# Patient Record
Sex: FEMALE | NOT HISPANIC OR LATINO | URBAN - METROPOLITAN AREA
[De-identification: names, ages, dates, MRNs, and addresses within clinical notes are randomized per-mention and may not be internally consistent; named-entity substitution may affect disease eponyms.]

---

## 2017-11-03 ENCOUNTER — IMPORTED ENCOUNTER (OUTPATIENT)
Dept: URBAN - METROPOLITAN AREA CLINIC 1 | Facility: CLINIC | Age: 72
End: 2017-11-03

## 2017-11-03 PROBLEM — H35.033: Noted: 2017-11-03

## 2017-11-03 PROBLEM — H04.123: Noted: 2017-11-03

## 2017-11-03 PROBLEM — Z96.1: Noted: 2017-11-03

## 2017-11-03 PROBLEM — H16.143: Noted: 2017-11-03

## 2017-11-03 PROCEDURE — 92014 COMPRE OPH EXAM EST PT 1/>: CPT

## 2017-11-03 NOTE — PATIENT DISCUSSION
1.  JODI w/ PEK OU- Improved OU. The continuation of artificial tears were recommended. 2.  GR I Hypertensive Retinopathy OU- Stable continue HTN Control3. Pseudophakia OU (StandardOU)- Doing well. 4.  Patient defers the refraction at today's visit    5. H/o RK OU6. Return for an appointment for a 27 in 1 year with Dr. Jennifer Peguero.

## 2018-11-02 ENCOUNTER — IMPORTED ENCOUNTER (OUTPATIENT)
Dept: URBAN - METROPOLITAN AREA CLINIC 1 | Facility: CLINIC | Age: 73
End: 2018-11-02

## 2018-11-02 PROBLEM — H35.033: Noted: 2018-11-02

## 2018-11-02 PROBLEM — H04.123: Noted: 2018-11-02

## 2018-11-02 PROBLEM — H16.143: Noted: 2018-11-02

## 2018-11-02 PROCEDURE — 92014 COMPRE OPH EXAM EST PT 1/>: CPT

## 2018-11-02 NOTE — PATIENT DISCUSSION
1.  JODI w/ PEK OU- Cont ATs TID OU Routinely. 2.  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 Pseudophakia OU (StandardOU) 4. PVD OS- stable RD precautions. 5.  H/o BETTYE OUReturn for an appointment in 1 yr 27 with Dr. Fernandez [place].

## 2019-11-01 ENCOUNTER — IMPORTED ENCOUNTER (OUTPATIENT)
Dept: URBAN - METROPOLITAN AREA CLINIC 1 | Facility: CLINIC | Age: 74
End: 2019-11-01

## 2019-11-01 PROBLEM — H43.813: Noted: 2019-11-01

## 2019-11-01 PROBLEM — Z96.1: Noted: 2019-11-01

## 2019-11-01 PROBLEM — H16.143: Noted: 2019-11-01

## 2019-11-01 PROBLEM — H43.812: Noted: 2019-11-01

## 2019-11-01 PROBLEM — H35.033: Noted: 2019-11-01

## 2019-11-01 PROBLEM — H04.123: Noted: 2019-11-01

## 2019-11-01 PROCEDURE — 92014 COMPRE OPH EXAM EST PT 1/>: CPT

## 2019-11-01 NOTE — PATIENT DISCUSSION
1.  JODI w/ PEK OU -- Increase ATs QID OU Routinely. 2.  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 Pseudophakia OU (StandardOU) 4. PVD OS- stable RD precautions. 5.  H/o RK OUPatient defers MRx today. Return for an appointment in 1 yr 27 with Dr. Tabby May.

## 2020-11-06 ENCOUNTER — IMPORTED ENCOUNTER (OUTPATIENT)
Dept: URBAN - METROPOLITAN AREA CLINIC 1 | Facility: CLINIC | Age: 75
End: 2020-11-06

## 2020-11-06 PROBLEM — H16.143: Noted: 2020-11-06

## 2020-11-06 PROBLEM — H04.123: Noted: 2020-11-06

## 2020-11-06 PROCEDURE — 92014 COMPRE OPH EXAM EST PT 1/>: CPT

## 2020-11-06 NOTE — PATIENT DISCUSSION
1.  JODI w/ PEK OU -- Increase ATs QID OU Routinely. 2.  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 Pseudophakia OU (StandardOU) 4. PVD OS- stable RD precautions. 5.  H/o RK OUPatient defers MRx today. Return for an appointment in 1 year 27 with Dr. Karen Vieira.

## 2021-11-09 ENCOUNTER — IMPORTED ENCOUNTER (OUTPATIENT)
Dept: URBAN - METROPOLITAN AREA CLINIC 1 | Facility: CLINIC | Age: 76
End: 2021-11-09

## 2021-11-09 PROBLEM — Z96.1: Noted: 2021-11-09

## 2021-11-09 PROBLEM — H16.143: Noted: 2021-11-09

## 2021-11-09 PROBLEM — H04.123: Noted: 2021-11-09

## 2021-11-09 PROBLEM — H26.493: Noted: 2021-11-09

## 2021-11-09 PROCEDURE — 99214 OFFICE O/P EST MOD 30 MIN: CPT

## 2022-04-02 ASSESSMENT — VISUAL ACUITY
OS_SC: J2
OS_SC: J1
OD_CC: 20/25-2
OD_CC: 20/25
OD_CC: 20/25
OS_CC: 20/25-2
OS_SC: J1
OD_CC: 20/20
OD_CC: 20/20
OS_SC: J1
OS_SC: J1

## 2022-04-02 ASSESSMENT — TONOMETRY
OD_IOP_MMHG: 12
OS_IOP_MMHG: 12
OD_IOP_MMHG: 11
OS_IOP_MMHG: 12
OS_IOP_MMHG: 12
OD_IOP_MMHG: 12
OD_IOP_MMHG: 11
OS_IOP_MMHG: 11
OS_IOP_MMHG: 12
OD_IOP_MMHG: 11

## 2022-04-02 ASSESSMENT — KERATOMETRY
OD_K2POWER_DIOPTERS: 44.50
OS_K2POWER_DIOPTERS: 43.00
OS_AXISANGLE_DEGREES: 041
OD_AXISANGLE2_DEGREES: 074
OD_K1POWER_DIOPTERS: 45.25
OS_K1POWER_DIOPTERS: 43.50
OD_AXISANGLE_DEGREES: 164
OS_AXISANGLE2_DEGREES: 131

## 2022-11-10 ENCOUNTER — COMPREHENSIVE EXAM (OUTPATIENT)
Dept: URBAN - METROPOLITAN AREA CLINIC 1 | Facility: CLINIC | Age: 77
End: 2022-11-10

## 2022-11-10 DIAGNOSIS — H35.033: ICD-10-CM

## 2022-11-10 DIAGNOSIS — H04.123: ICD-10-CM

## 2022-11-10 DIAGNOSIS — Z96.1: ICD-10-CM

## 2022-11-10 DIAGNOSIS — H16.143: ICD-10-CM

## 2022-11-10 DIAGNOSIS — H26.493: ICD-10-CM

## 2022-11-10 DIAGNOSIS — H43.812: ICD-10-CM

## 2022-11-10 PROCEDURE — 99214 OFFICE O/P EST MOD 30 MIN: CPT

## 2022-11-10 PROCEDURE — 92015 DETERMINE REFRACTIVE STATE: CPT

## 2022-11-10 ASSESSMENT — TONOMETRY
OD_IOP_MMHG: 12
OS_IOP_MMHG: 12

## 2022-11-10 ASSESSMENT — VISUAL ACUITY
OS_BAT: 20/40
OS_SC: J1
OD_SC: 20/25+2
OD_BAT: 20/40

## 2024-02-12 ENCOUNTER — COMPREHENSIVE EXAM (OUTPATIENT)
Dept: URBAN - METROPOLITAN AREA CLINIC 1 | Facility: CLINIC | Age: 79
End: 2024-02-12

## 2024-02-12 DIAGNOSIS — H02.831: ICD-10-CM

## 2024-02-12 DIAGNOSIS — H02.423: ICD-10-CM

## 2024-02-12 DIAGNOSIS — H16.143: ICD-10-CM

## 2024-02-12 DIAGNOSIS — H43.812: ICD-10-CM

## 2024-02-12 DIAGNOSIS — H04.123: ICD-10-CM

## 2024-02-12 DIAGNOSIS — H57.813: ICD-10-CM

## 2024-02-12 DIAGNOSIS — H35.033: ICD-10-CM

## 2024-02-12 DIAGNOSIS — H26.493: ICD-10-CM

## 2024-02-12 DIAGNOSIS — H02.834: ICD-10-CM

## 2024-02-12 PROCEDURE — 99214 OFFICE O/P EST MOD 30 MIN: CPT

## 2024-02-12 PROCEDURE — 92015 DETERMINE REFRACTIVE STATE: CPT

## 2024-02-12 ASSESSMENT — VISUAL ACUITY
OS_BAT: 20/40
OS_SC: J1
OD_SC: 20/25
OD_BAT: 20/40

## 2024-02-12 ASSESSMENT — TONOMETRY
OD_IOP_MMHG: 12
OS_IOP_MMHG: 12

## 2024-02-23 ENCOUNTER — CLINIC PROCEDURE ONLY (OUTPATIENT)
Dept: URBAN - METROPOLITAN AREA CLINIC 1 | Facility: CLINIC | Age: 79
End: 2024-02-23

## 2024-02-23 DIAGNOSIS — Z96.1: ICD-10-CM

## 2024-02-23 DIAGNOSIS — H26.493: ICD-10-CM

## 2024-02-23 PROCEDURE — 66821 AFTER CATARACT LASER SURGERY: CPT

## 2024-04-19 ENCOUNTER — CLINIC PROCEDURE ONLY (OUTPATIENT)
Dept: URBAN - METROPOLITAN AREA CLINIC 1 | Facility: CLINIC | Age: 79
End: 2024-04-19

## 2024-04-19 DIAGNOSIS — Z96.1: ICD-10-CM

## 2024-04-19 DIAGNOSIS — H26.492: ICD-10-CM

## 2024-04-19 PROCEDURE — 66821 AFTER CATARACT LASER SURGERY: CPT | Mod: 79,LT

## 2024-05-13 ENCOUNTER — POST-OP (OUTPATIENT)
Dept: URBAN - METROPOLITAN AREA CLINIC 1 | Facility: CLINIC | Age: 79
End: 2024-05-13

## 2024-05-13 DIAGNOSIS — Z96.1: ICD-10-CM

## 2024-05-13 DIAGNOSIS — Z98.890: ICD-10-CM

## 2024-05-13 ASSESSMENT — VISUAL ACUITY
OS_SC: J1
OD_SC: J1
OD_SC: 20/20
OS_SC: 20/40

## 2024-05-13 ASSESSMENT — TONOMETRY
OS_IOP_MMHG: 11
OD_IOP_MMHG: 10

## 2025-02-10 ENCOUNTER — COMPREHENSIVE EXAM (OUTPATIENT)
Age: 80
End: 2025-02-10

## 2025-02-10 DIAGNOSIS — H35.033: ICD-10-CM

## 2025-02-10 DIAGNOSIS — H04.123: ICD-10-CM

## 2025-02-10 DIAGNOSIS — H16.143: ICD-10-CM

## 2025-02-10 DIAGNOSIS — H43.812: ICD-10-CM

## 2025-02-10 DIAGNOSIS — H02.423: ICD-10-CM

## 2025-02-10 PROCEDURE — 99214 OFFICE O/P EST MOD 30 MIN: CPT

## 2025-05-19 ENCOUNTER — FOLLOW UP (OUTPATIENT)
Age: 80
End: 2025-05-19

## 2025-05-19 DIAGNOSIS — H16.143: ICD-10-CM

## 2025-05-19 DIAGNOSIS — H04.123: ICD-10-CM

## 2025-05-19 PROCEDURE — 99213 OFFICE O/P EST LOW 20 MIN: CPT
